# Patient Record
Sex: MALE | Race: WHITE | Employment: UNEMPLOYED | URBAN - METROPOLITAN AREA
[De-identification: names, ages, dates, MRNs, and addresses within clinical notes are randomized per-mention and may not be internally consistent; named-entity substitution may affect disease eponyms.]

---

## 2019-07-17 ENCOUNTER — HOSPITAL ENCOUNTER (EMERGENCY)
Age: 34
Discharge: HOME | End: 2019-07-18
Payer: MEDICAID

## 2019-07-17 VITALS
DIASTOLIC BLOOD PRESSURE: 75 MMHG | SYSTOLIC BLOOD PRESSURE: 126 MMHG | HEART RATE: 98 BPM | TEMPERATURE: 97.5 F | RESPIRATION RATE: 16 BRPM | BODY MASS INDEX: 21.97 KG/M2 | WEIGHT: 140 LBS | HEIGHT: 67 IN | OXYGEN SATURATION: 99 %

## 2019-07-17 DIAGNOSIS — L02.91 ABSCESS: Primary | ICD-10-CM

## 2019-07-17 PROCEDURE — 6370000000 HC RX 637 (ALT 250 FOR IP): Performed by: PHYSICIAN ASSISTANT

## 2019-07-17 PROCEDURE — 87205 SMEAR GRAM STAIN: CPT

## 2019-07-17 PROCEDURE — 87077 CULTURE AEROBIC IDENTIFY: CPT

## 2019-07-17 PROCEDURE — 87070 CULTURE OTHR SPECIMN AEROBIC: CPT

## 2019-07-17 PROCEDURE — 99282 EMERGENCY DEPT VISIT SF MDM: CPT

## 2019-07-17 PROCEDURE — 87186 SC STD MICRODIL/AGAR DIL: CPT

## 2019-07-17 RX ORDER — CLINDAMYCIN HYDROCHLORIDE 150 MG/1
450 CAPSULE ORAL ONCE
Status: COMPLETED | OUTPATIENT
Start: 2019-07-17 | End: 2019-07-17

## 2019-07-17 RX ORDER — CLINDAMYCIN HYDROCHLORIDE 150 MG/1
450 CAPSULE ORAL 3 TIMES DAILY
Qty: 90 CAPSULE | Refills: 0 | Status: SHIPPED | OUTPATIENT
Start: 2019-07-17 | End: 2019-07-27

## 2019-07-17 RX ADMIN — CLINDAMYCIN HYDROCHLORIDE 450 MG: 150 CAPSULE ORAL at 23:55

## 2019-07-17 ASSESSMENT — PAIN DESCRIPTION - LOCATION: LOCATION: SCROTUM

## 2019-07-17 ASSESSMENT — PAIN DESCRIPTION - ORIENTATION: ORIENTATION: OTHER (COMMENT)

## 2019-07-17 ASSESSMENT — ENCOUNTER SYMPTOMS
NAUSEA: 0
EYES NEGATIVE: 1
RESPIRATORY NEGATIVE: 1
SHORTNESS OF BREATH: 0
COUGH: 0
GASTROINTESTINAL NEGATIVE: 1
VOMITING: 0

## 2019-07-17 ASSESSMENT — PAIN DESCRIPTION - PAIN TYPE: TYPE: ACUTE PAIN

## 2019-07-17 ASSESSMENT — PAIN SCALES - GENERAL: PAINLEVEL_OUTOF10: 6

## 2019-07-18 NOTE — ED PROVIDER NOTES
[facility] Coma Scale  Eye Opening: Spontaneous  Best Verbal Response: Oriented  Best Motor Response: Obeys commands  Jorge Coma Scale Score: 15    [facility]     Heart Score         PHYSICAL EXAM  (up to 7 for level 4, 8 or more for level 5)     ED Triage Vitals [07/17/19 2035]   BP Temp Temp Source Pulse Resp SpO2 Height Weight   126/75 97.5 °F (36.4 °C) Oral 98 16 99 % 5' 7\" (1.702 m) 140 lb (63.5 kg)       Physical Exam   Constitutional: He appears well-developed and well-nourished. HENT:   Head: Normocephalic. Nose: Nose normal.   Mouth/Throat: Oropharynx is clear and moist. No oropharyngeal exudate. Eyes: Pupils are equal, round, and reactive to light. Conjunctivae and EOM are normal. Right eye exhibits no discharge. Left eye exhibits no discharge. Neck: Normal range of motion. Cardiovascular: Normal rate, regular rhythm and normal heart sounds. Exam reveals no gallop and no friction rub. No murmur heard. Pulmonary/Chest: Effort normal and breath sounds normal. No respiratory distress. He has no wheezes. Abdominal: Soft. Bowel sounds are normal. He exhibits no distension. There is no tenderness. Musculoskeletal: Normal range of motion. Neurological: He is alert. Skin: Skin is warm and dry. He is not diaphoretic. Psychiatric: He has a normal mood and affect. His behavior is normal.   Nursing note and vitals reviewed. DIAGNOSTIC RESULTS   LABS:    Labs Reviewed   WOUND CULTURE       All other labs werewithin normal range or not returned as of this dictation. RADIOLOGY:     Interpretation per the Radiologist below, if available at the time of this note:    No orders to display     No results found.         EMERGENCYDEPARTMENT COURSE and DIFFERENTIAL DIAGNOSIS/MDM:   Vitals:    Vitals:    07/17/19 2035   BP: 126/75   Pulse: 98   Resp: 16   Temp: 97.5 °F (36.4 °C)   TempSrc: Oral   SpO2: 99%   Weight: 140 lb (63.5 kg)   Height: 5' 7\" (1.702 m)       Patient was given symptoms occur. We have discussed the symptoms which are most concerning (e.g., changing or worsening pain, fever, numbness, weakness, cool or painful digits) that necessitate immediate return. FINAL IMPRESSION      1. Abscess          DISPOSITION/PLAN   DISPOSITION Decision To Discharge 07/17/2019 11:27:51 PM      PATIENT REFERRED TO:  Jacob Rehman MD  [address] [place] [address]  [phone]      for a recheck in 1-2  days    [facility] ED  [address] [facility]  [phone]    for a recheck in 1-2  days      DISCHARGE MEDICATIONS:  New Prescriptions    CLINDAMYCIN (CLEOCIN) 150 MG CAPSULE    Take 3 capsules by mouth 3 times daily for 10 days       DISCONTINUED MEDICATIONS:  Discontinued Medications    METHADONE (METHADOSE) 40 MG DISINTEGRATING TABLET    Take 120 mg by mouth every 4 hours as needed.                 (Please note that portions of this note were completed with a voice recognition program.  Efforts were made to edit the dictations but occasionally words are mis-transcribed.)    Merlinda Clarke, PA-C (electronically signed)          Merlinda Clarke, PA-C  07/17/19 8618

## 2019-07-20 LAB
GRAM STAIN RESULT: ABNORMAL
ORGANISM: ABNORMAL
WOUND/ABSCESS: ABNORMAL
WOUND/ABSCESS: ABNORMAL

## 2020-07-23 ENCOUNTER — HOSPITAL ENCOUNTER (EMERGENCY)
Age: 35
Discharge: LEFT BEFORE DISCHARGE | End: 2020-07-23
Attending: EMERGENCY MEDICINE
Payer: MEDICAID

## 2020-07-23 VITALS
BODY MASS INDEX: 25.9 KG/M2 | DIASTOLIC BLOOD PRESSURE: 68 MMHG | SYSTOLIC BLOOD PRESSURE: 158 MMHG | RESPIRATION RATE: 25 BRPM | HEART RATE: 89 BPM | WEIGHT: 165 LBS | HEIGHT: 67 IN | TEMPERATURE: 98.3 F | OXYGEN SATURATION: 91 %

## 2020-07-23 LAB
A/G RATIO: 1.5 (ref 1.1–2.2)
ACETAMINOPHEN LEVEL: <5 UG/ML (ref 10–30)
ALBUMIN SERPL-MCNC: 4.7 G/DL (ref 3.4–5)
ALP BLD-CCNC: 84 U/L (ref 40–129)
ALT SERPL-CCNC: 115 U/L (ref 10–40)
ANION GAP SERPL CALCULATED.3IONS-SCNC: 14 MMOL/L (ref 3–16)
AST SERPL-CCNC: 44 U/L (ref 15–37)
BASOPHILS ABSOLUTE: 0.1 K/UL (ref 0–0.2)
BASOPHILS RELATIVE PERCENT: 0.8 %
BILIRUB SERPL-MCNC: 1.1 MG/DL (ref 0–1)
BUN BLDV-MCNC: 12 MG/DL (ref 7–20)
CALCIUM SERPL-MCNC: 9.5 MG/DL (ref 8.3–10.6)
CHLORIDE BLD-SCNC: 96 MMOL/L (ref 99–110)
CO2: 25 MMOL/L (ref 21–32)
CREAT SERPL-MCNC: 0.9 MG/DL (ref 0.9–1.3)
EOSINOPHILS ABSOLUTE: 0 K/UL (ref 0–0.6)
EOSINOPHILS RELATIVE PERCENT: 0.2 %
ETHANOL: NORMAL MG/DL (ref 0–0.08)
GFR AFRICAN AMERICAN: >60
GFR NON-AFRICAN AMERICAN: >60
GLOBULIN: 3.1 G/DL
GLUCOSE BLD-MCNC: 108 MG/DL (ref 70–99)
HCT VFR BLD CALC: 43.7 % (ref 40.5–52.5)
HEMOGLOBIN: 15.1 G/DL (ref 13.5–17.5)
LYMPHOCYTES ABSOLUTE: 1.2 K/UL (ref 1–5.1)
LYMPHOCYTES RELATIVE PERCENT: 18.3 %
MCH RBC QN AUTO: 31.6 PG (ref 26–34)
MCHC RBC AUTO-ENTMCNC: 34.6 G/DL (ref 31–36)
MCV RBC AUTO: 91.2 FL (ref 80–100)
MONOCYTES ABSOLUTE: 0.5 K/UL (ref 0–1.3)
MONOCYTES RELATIVE PERCENT: 7.5 %
NEUTROPHILS ABSOLUTE: 5 K/UL (ref 1.7–7.7)
NEUTROPHILS RELATIVE PERCENT: 73.2 %
PDW BLD-RTO: 13.3 % (ref 12.4–15.4)
PLATELET # BLD: 171 K/UL (ref 135–450)
PMV BLD AUTO: 8 FL (ref 5–10.5)
POTASSIUM REFLEX MAGNESIUM: 3.7 MMOL/L (ref 3.5–5.1)
RBC # BLD: 4.79 M/UL (ref 4.2–5.9)
SALICYLATE, SERUM: <0.3 MG/DL (ref 15–30)
SODIUM BLD-SCNC: 135 MMOL/L (ref 136–145)
TOTAL PROTEIN: 7.8 G/DL (ref 6.4–8.2)
WBC # BLD: 6.8 K/UL (ref 4–11)

## 2020-07-23 PROCEDURE — 99285 EMERGENCY DEPT VISIT HI MDM: CPT

## 2020-07-23 PROCEDURE — G0480 DRUG TEST DEF 1-7 CLASSES: HCPCS

## 2020-07-23 PROCEDURE — 85025 COMPLETE CBC W/AUTO DIFF WBC: CPT

## 2020-07-23 PROCEDURE — 80053 COMPREHEN METABOLIC PANEL: CPT

## 2020-07-23 PROCEDURE — 93005 ELECTROCARDIOGRAM TRACING: CPT | Performed by: EMERGENCY MEDICINE

## 2020-07-23 NOTE — ED PROVIDER NOTES
Lifestyle    Physical activity     Days per week: Not on file     Minutes per session: Not on file    Stress: Not on file   Relationships    Social connections     Talks on phone: Not on file     Gets together: Not on file     Attends Orthodox service: Not on file     Active member of club or organization: Not on file     Attends meetings of clubs or organizations: Not on file     Relationship status: Not on file    Intimate partner violence     Fear of current or ex partner: Not on file     Emotionally abused: Not on file     Physically abused: Not on file     Forced sexual activity: Not on file   Other Topics Concern    Not on file   Social History Narrative    Not on file     No current facility-administered medications for this encounter. No current outpatient medications on file. No Known Allergies    REVIEW OF SYSTEMS  10 systems reviewed, pertinent positives and negatives per HPI, otherwise noted to be negative. PHYSICAL EXAM  ED Triage Vitals   Enc Vitals Group      BP 07/23/20 1753 (!) 150/98      Pulse 07/23/20 1749 92      Resp 07/23/20 1752 25      Temp 07/23/20 1749 98.3 °F (36.8 °C)      Temp Source 07/23/20 1749 Oral      SpO2 07/23/20 1752 91 %      Weight 07/23/20 1749 165 lb (74.8 kg)      Height 07/23/20 1749 5' 7\" (1.702 m)      Head Circumference --       Peak Flow --       Pain Score --       Pain Loc --       Pain Edu? --       Excl. in [address]? --          General appearance: Awake. Cooperative. No acute distress. HENT: Normocephalic. Atraumatic. Mucous membranes are moist.  Neck: Supple. Well healed tracheostomy site. Eyes: PERRL. EOMI. Heart/Chest: RRR. No murmurs. Lungs: Respirations unlabored. CTAB. Good air exchange. Speaking comfortably in full sentences. Abdomen: Soft. Non-tender. Non-distended. No rebound or guarding. Musculoskeletal: No extremity edema. No deformity. No tenderness in the extremities. All extremities neurovascularly intact.   Skin: Warm and dry. No acute rashes. Neurological:   - Alert and oriented to person, place, time, situation. - CN II-XII intact. - Full and symmetric strength bilateral upper and lower extremities. - Sensation intact to light touch in all extremities. - Normal rapidly alternating movements  - Normal finger to nose. Normal heel to shin.   - Gait is normal.   Psychiatric: Mood/affect: normal    LABS  I have reviewed all labs for this visit.    Results for orders placed or performed during the hospital encounter of 07/23/20   CBC Auto Differential   Result Value Ref Range    WBC 6.8 4.0 - 11.0 K/uL    RBC 4.79 4.20 - 5.90 M/uL    Hemoglobin 15.1 13.5 - 17.5 g/dL    Hematocrit 43.7 40.5 - 52.5 %    MCV 91.2 80.0 - 100.0 fL    MCH 31.6 26.0 - 34.0 pg    MCHC 34.6 31.0 - 36.0 g/dL    RDW 13.3 12.4 - 15.4 %    Platelets 133 223 - 567 K/uL    MPV 8.0 5.0 - 10.5 fL    Neutrophils % 73.2 %    Lymphocytes % 18.3 %    Monocytes % 7.5 %    Eosinophils % 0.2 %    Basophils % 0.8 %    Neutrophils Absolute 5.0 1.7 - 7.7 K/uL    Lymphocytes Absolute 1.2 1.0 - 5.1 K/uL    Monocytes Absolute 0.5 0.0 - 1.3 K/uL    Eosinophils Absolute 0.0 0.0 - 0.6 K/uL    Basophils Absolute 0.1 0.0 - 0.2 K/uL   Comprehensive Metabolic Panel w/ Reflex to MG   Result Value Ref Range    Sodium 135 (L) 136 - 145 mmol/L    Potassium reflex Magnesium 3.7 3.5 - 5.1 mmol/L    Chloride 96 (L) 99 - 110 mmol/L    CO2 25 21 - 32 mmol/L    Anion Gap 14 3 - 16    Glucose 108 (H) 70 - 99 mg/dL    BUN 12 7 - 20 mg/dL    CREATININE 0.9 0.9 - 1.3 mg/dL    GFR Non-African American >60 >60    GFR African American >60 >60    Calcium 9.5 8.3 - 10.6 mg/dL    Total Protein 7.8 6.4 - 8.2 g/dL    Alb 4.7 3.4 - 5.0 g/dL    Albumin/Globulin Ratio 1.5 1.1 - 2.2    Total Bilirubin 1.1 (H) 0.0 - 1.0 mg/dL    Alkaline Phosphatase 84 40 - 129 U/L     (H) 10 - 40 U/L    AST 44 (H) 15 - 37 U/L    Globulin 3.1 g/dL   Ethanol   Result Value Ref Range    Ethanol Lvl None Detected mg/dL   Acetaminophen level   Result Value Ref Range    Acetaminophen Level <5 (L) 10 - 30 ug/mL   Salicylate   Result Value Ref Range    Salicylate, Serum <0.7 (L) 15.0 - 30.0 mg/dL   EKG 12 Lead   Result Value Ref Range    Ventricular Rate 80 BPM    Atrial Rate 80 BPM    P-R Interval 128 ms    QRS Duration 80 ms    Q-T Interval 380 ms    QTc Calculation (Bazett) 438 ms    P Axis 35 degrees    R Axis -15 degrees    T Axis 44 degrees    Diagnosis       Normal sinus rhythmNormal ECGWhen compared with ECG of 17-SEP-2011 12:59,T wave amplitude has increased in Lateral leads       RADIOLOGY  I have reviewed all radiographic studies for this visit. No orders to display        ECG  EKG interpreted by myself. Rate: normal  Rhythm: NSR  Axis: normal  Intervals: within normal limits  ST Segments: no acute abnormality  T waves: no acute abnormality  Comparison: no prior  Impression: NSR with no acute abnormality       ED COURSE/MDM  Patient seen and evaluated. Old records reviewed. Labs and imaging reviewed and results discussed with patient/family to extent possible. This is a 59-year-old male who presents with concern for labile heart rate in the setting of ingesting jimson weed. On arrival the patient is fully alert and oriented, GCS 15. Slightly hypertensive with otherwise reassuring vital signs. Manifests no clinically apparent toxidrome. EKG reveals normal sinus rhythm with no acute abnormality. Mild hyponatremia and hypochloremia with preserved renal function. ALT is elevated at 115, AST just above the upper limit of normal at 44. CBC no leukocytosis or anemia. Acetaminophen, salicylates, and ethanol are negative. The patient eloped from the emergency department before his evaluation and treatment could be completed. Attempts were made to find the patient to no avail. Should the patient wish to continue his evaluation and treatment we would be glad to do so if he presents again.   Overall, however, the patient's exam, vital signs, diagnostics have been generally reassuring with the exception of a slight transaminitis. During the patient's ED course, the patient was given:  Medications - No data to display     All questions were answered and the patient/family expressed understanding and agreement with the plan. PROCEDURES  None    CRITICAL CARE  N/A    CLINICAL IMPRESSION  1. Palpitations    2. Substance abuse ([address]    3. Transaminitis        DISPOSITION   Eloped    Ezekiel Bennett MD    Note: This chart was created using voice recognition dictation software. Efforts were made by me to ensure accuracy, however some errors may be present due to limitations of this technology and occasionally words are not transcribed correctly.        Ezekiel Bennett MD  07/23/20 [id number]

## 2020-07-23 NOTE — ED NOTES
Writer in to see patient at this time. Patient is fully clothed and has pulled out IV. Patient states he wants to leave. Writer left room to locate Dr. Crescencio Hodge to notify him of patient wanting to leave AMA. When writer returned to room patient was already gone.       Marilee Rodriguez RN  07/23/20 7247

## 2020-07-24 LAB
EKG ATRIAL RATE: 80 BPM
EKG DIAGNOSIS: NORMAL
EKG P AXIS: 35 DEGREES
EKG P-R INTERVAL: 128 MS
EKG Q-T INTERVAL: 380 MS
EKG QRS DURATION: 80 MS
EKG QTC CALCULATION (BAZETT): 438 MS
EKG R AXIS: -15 DEGREES
EKG T AXIS: 44 DEGREES
EKG VENTRICULAR RATE: 80 BPM

## 2020-07-24 PROCEDURE — 93010 ELECTROCARDIOGRAM REPORT: CPT | Performed by: INTERNAL MEDICINE

## 2020-08-31 ENCOUNTER — HOSPITAL ENCOUNTER (EMERGENCY)
Age: 35
Discharge: HOME | End: 2020-08-31
Attending: EMERGENCY MEDICINE
Payer: MEDICAID

## 2020-08-31 VITALS
WEIGHT: 150 LBS | OXYGEN SATURATION: 99 % | SYSTOLIC BLOOD PRESSURE: 140 MMHG | HEIGHT: 68 IN | TEMPERATURE: 98.1 F | HEART RATE: 90 BPM | DIASTOLIC BLOOD PRESSURE: 83 MMHG | BODY MASS INDEX: 22.73 KG/M2 | RESPIRATION RATE: 20 BRPM

## 2020-08-31 LAB
A/G RATIO: 1.5 (ref 1.1–2.2)
ACETAMINOPHEN LEVEL: <5 UG/ML (ref 10–30)
ALBUMIN SERPL-MCNC: 4.5 G/DL (ref 3.4–5)
ALP BLD-CCNC: 76 U/L (ref 40–129)
ALT SERPL-CCNC: 109 U/L (ref 10–40)
AMPHETAMINE SCREEN, URINE: POSITIVE
ANION GAP SERPL CALCULATED.3IONS-SCNC: 10 MMOL/L (ref 3–16)
AST SERPL-CCNC: 89 U/L (ref 15–37)
BARBITURATE SCREEN URINE: ABNORMAL
BASOPHILS ABSOLUTE: 0 K/UL (ref 0–0.2)
BASOPHILS RELATIVE PERCENT: 0.7 %
BENZODIAZEPINE SCREEN, URINE: ABNORMAL
BILIRUB SERPL-MCNC: 1 MG/DL (ref 0–1)
BILIRUBIN URINE: ABNORMAL
BLOOD, URINE: NEGATIVE
BUN BLDV-MCNC: 21 MG/DL (ref 7–20)
CALCIUM SERPL-MCNC: 8.8 MG/DL (ref 8.3–10.6)
CANNABINOID SCREEN URINE: ABNORMAL
CHLORIDE BLD-SCNC: 97 MMOL/L (ref 99–110)
CLARITY: CLEAR
CO2: 24 MMOL/L (ref 21–32)
COCAINE METABOLITE SCREEN URINE: ABNORMAL
COLOR: YELLOW
CREAT SERPL-MCNC: 1.2 MG/DL (ref 0.9–1.3)
EOSINOPHILS ABSOLUTE: 0.1 K/UL (ref 0–0.6)
EOSINOPHILS RELATIVE PERCENT: 1.8 %
ETHANOL: NORMAL MG/DL (ref 0–0.08)
GFR AFRICAN AMERICAN: >60
GFR NON-AFRICAN AMERICAN: >60
GLOBULIN: 3.1 G/DL
GLUCOSE BLD-MCNC: 88 MG/DL (ref 70–99)
GLUCOSE BLD-MCNC: 92 MG/DL (ref 70–99)
GLUCOSE URINE: NEGATIVE MG/DL
HCT VFR BLD CALC: 42 % (ref 40.5–52.5)
HEMOGLOBIN: 14.4 G/DL (ref 13.5–17.5)
KETONES, URINE: 40 MG/DL
LEUKOCYTE ESTERASE, URINE: NEGATIVE
LIPASE: 23 U/L (ref 13–60)
LYMPHOCYTES ABSOLUTE: 1.5 K/UL (ref 1–5.1)
LYMPHOCYTES RELATIVE PERCENT: 28 %
Lab: ABNORMAL
MCH RBC QN AUTO: 31.2 PG (ref 26–34)
MCHC RBC AUTO-ENTMCNC: 34.3 G/DL (ref 31–36)
MCV RBC AUTO: 90.8 FL (ref 80–100)
METHADONE SCREEN, URINE: ABNORMAL
MICROSCOPIC EXAMINATION: ABNORMAL
MONOCYTES ABSOLUTE: 0.5 K/UL (ref 0–1.3)
MONOCYTES RELATIVE PERCENT: 9.3 %
NEUTROPHILS ABSOLUTE: 3.3 K/UL (ref 1.7–7.7)
NEUTROPHILS RELATIVE PERCENT: 60.2 %
NITRITE, URINE: NEGATIVE
OPIATE SCREEN URINE: ABNORMAL
OXYCODONE URINE: ABNORMAL
PDW BLD-RTO: 12.9 % (ref 12.4–15.4)
PERFORMED ON: NORMAL
PH UA: 6
PH UA: 6 (ref 5–8)
PHENCYCLIDINE SCREEN URINE: ABNORMAL
PLATELET # BLD: 161 K/UL (ref 135–450)
PMV BLD AUTO: 7.7 FL (ref 5–10.5)
POTASSIUM REFLEX MAGNESIUM: 3.7 MMOL/L (ref 3.5–5.1)
PROPOXYPHENE SCREEN: ABNORMAL
PROTEIN UA: NEGATIVE MG/DL
RBC # BLD: 4.62 M/UL (ref 4.2–5.9)
SALICYLATE, SERUM: <0.3 MG/DL (ref 15–30)
SODIUM BLD-SCNC: 131 MMOL/L (ref 136–145)
SPECIFIC GRAVITY UA: >=1.03 (ref 1–1.03)
TOTAL PROTEIN: 7.6 G/DL (ref 6.4–8.2)
URINE TYPE: ABNORMAL
UROBILINOGEN, URINE: 0.2 E.U./DL
WBC # BLD: 5.5 K/UL (ref 4–11)

## 2020-08-31 PROCEDURE — 99283 EMERGENCY DEPT VISIT LOW MDM: CPT

## 2020-08-31 PROCEDURE — 87086 URINE CULTURE/COLONY COUNT: CPT

## 2020-08-31 PROCEDURE — G0480 DRUG TEST DEF 1-7 CLASSES: HCPCS

## 2020-08-31 PROCEDURE — 81003 URINALYSIS AUTO W/O SCOPE: CPT

## 2020-08-31 PROCEDURE — 96360 HYDRATION IV INFUSION INIT: CPT

## 2020-08-31 PROCEDURE — 85025 COMPLETE CBC W/AUTO DIFF WBC: CPT

## 2020-08-31 PROCEDURE — 2580000003 HC RX 258: Performed by: EMERGENCY MEDICINE

## 2020-08-31 PROCEDURE — 80053 COMPREHEN METABOLIC PANEL: CPT

## 2020-08-31 PROCEDURE — 80307 DRUG TEST PRSMV CHEM ANLYZR: CPT

## 2020-08-31 PROCEDURE — 83690 ASSAY OF LIPASE: CPT

## 2020-08-31 RX ORDER — 0.9 % SODIUM CHLORIDE 0.9 %
1000 INTRAVENOUS SOLUTION INTRAVENOUS ONCE
Status: COMPLETED | OUTPATIENT
Start: 2020-08-31 | End: 2020-08-31

## 2020-08-31 RX ORDER — BUPROPION HYDROCHLORIDE 300 MG/1
TABLET ORAL
COMMUNITY
Start: 2020-08-18

## 2020-08-31 RX ADMIN — SODIUM CHLORIDE 1000 ML: 9 INJECTION, SOLUTION INTRAVENOUS at 05:58

## 2020-08-31 NOTE — ED PROVIDER NOTES
Emergency Physician Note  [address] [facility] ED  [address] [facility] [place]. [address]  Dept: [phone]  Loc: [phone]    Chief Complaint  Fatigue (pt arrives via EMS. Pt states he is weak, can't stop shaking, can't think right, woke up on floor around 1030pm. after 2 beers.)       History of Present Illness  Anna Marie Mosley III is a 27 y.o. male  has a past medical history of Anxiety, Guillain Barré syndrome ([address] Hepatitis C without hepatic coma, MRSA (methicillin resistant staph aureus) culture positive, Panic attack, and PTSD (post-traumatic stress disorder). who presents to the ED for generalized weakness. He states the symptoms been ongoing for the current day. Patient does not believe that he has been decreasing his fluid intake. He states he is also feeling shaky all over. Denies fever, chills, chest pain, shortness of breath, cough, abdominal pain, nausea, vomiting, diarrhea, headache, sore throat, dysuria, back pain, rash. No palliative/provocative factors. Unless otherwise stated in this report or unable to obtain because of the patient's clinical or mental status as evidenced by the medical record, this patient's positive and negative responses for review of systems, constitutional, psych, eyes, ENT, cardiovascular, respiratory, gastrointestinal, neurological, genitourinary, musculoskeletal, integument systems and systems related to the presenting problem are either stated in the preceding paragraph or were not pertinent or were negative for the symptoms and/or complaints related to the medical problem. I have reviewed the following from the nursing documentation:      Prior to Admission medications    Medication Sig Start Date End Date Taking?  Authorizing Provider   Buprenorphine HCl-Naloxone HCl (SUBOXONE SL) Place 8 mg under the tongue daily   Yes Historical Provider, MD   buPROPion (WELLBUTRIN XL) 300 MG extended release tablet 8/18/20   Historical Provider, MD       Allergies as of 08/31/2020    (No Known Allergies)       Past Medical History:   Diagnosis Date    Anxiety     [address]illain Barr[address] syndrome ([facility] [address]     Hepatitis C without hepatic coma     MRSA (methicillin resistant staph aureus) culture positive 07/17/2019    scrotal abscess    Panic attack     PTSD (post-traumatic stress disorder)         Surgical History:   Past Surgical History:   Procedure Laterality Date    HERNIA REPAIR      TRACHEOSTOMY      and removed        Family History:  History reviewed. No pertinent family history.     Social History     Socioeconomic History    Marital status: Single     Spouse name: Not on file    Number of children: Not on file    Years of education: Not on file    Highest education level: Not on file   Occupational History    Not on file   Social Needs    Financial resource strain: Not on file    Food insecurity     Worry: Not on file     Inability: Not on file    Transportation needs     Medical: Not on file     Non-medical: Not on file   Tobacco Use    Smoking status: Current Every Day Smoker     Packs/day: 1.00     Types: Cigarettes    Smokeless tobacco: Never Used   Substance and Sexual Activity    Alcohol use: Yes     Comment: rarely    Drug use: Yes     Types: Marijuana, Methamphetamines     Comment: Heroin    Sexual activity: Yes     Partners: Female   Lifestyle    Physical activity     Days per week: Not on file     Minutes per session: Not on file    Stress: Not on file   Relationships    Social connections     Talks on phone: Not on file     Gets together: Not on file     Attends Worship service: Not on file     Active member of club or organization: Not on file     Attends meetings of clubs or organizations: Not on file     Relationship status: Not on file    Intimate partner violence     Fear of current or ex partner: Not on file     Emotionally abused: Not on file     Physically abused: Not on file Forced sexual activity: Not on file   Other Topics Concern    Not on file   Social History Narrative    Not on file       Nursing notes reviewed. ED Triage Vitals [08/31/20 0421]   Enc Vitals Group      BP (!) 155/87      Pulse 75      Resp 16      Temp 98.1 °F (36.7 °C)      Temp Source Oral      SpO2 97 %      Weight 150 lb (68 kg)      Height 5' 8\" (1.727 m)      Head Circumference       Peak Flow       Pain Score       Pain Loc       Pain Edu? Excl. in [address]? GENERAL:    Awake, alert. Well developed, well nourished with no apparent distress. Nontoxic-appearing, non-ill-appearing. HENT:    Normocephalic, Atraumatic, no lacerations. No ENT exam due to PPE. Oropharynx clear, no tonsilar inflammation, no tonsilar exudates,   no airway obstruction, moist mucous membranes. Uvula was midline and has symmetric rise. Tongue extension was midline and without tremor  EYES:    Conjunctiva normal,   Pupils equal round and reactive to light,   Extraocular movements normal.  NECK:    Trachea is midline. No stridor. No lymphadenopathy of the anterior cervical chain   no lymphadenopathy of the posterior cervical chain. No meningeal signs, Supple without JVD. No C-spine midline tenderness. CHEST:    Regular rate and regular rhythm, no murmurs/rubs/gallops,   normal S1/S2, chest wall non-tender. LUNGS:    No respiratory distress. No abdominal retractions, no sternal retractions. Clear to auscultation bilaterally, no wheezing, no rhochi, no rales  Speaking comfortably in full sentences  ABDOMEN:    Soft, non-tender, non-distended. No guarding. No rebound. No costovertebral angle tenderness to palpation. Normal BS, no organomegaly, no abdominal masses  EXTREMITIES:   Moves extremities x4 with purpose. Normal range of motion, no edema,   no tenderness, no deformity,   distal pulses present and equal bilaterally. SKIN:    Warm, dry and intact.   Multiple tattoos  NEUROLOGIC:  Normal mental status. Moving all extremities to command. Alert and oriented x4   without focal motor deficit or gross sensory deficit. Tremors of all 4 extremities however they resolve when patient is distracted  Normal speech. Strength 5/5 in bilateral upper and lower extremities. Sensation is intact in the upper and lower extremities. Cranial Nerves 2-12 are intact. +2 Patellar Reflexes Bilaterally, +2 Achilles Reflexes Bilaterally. Light touch in lower extremities bilaterally is intact. No overlying rashes. LE strength is 5/5.     PSYCHIATRIC:  Not anxious,   normal mood and affect,   thoughts are linear and organized,   without delusions/hallucinations,   responds appropriately to questions      LABS and DIAGNOSTIC RESULTS    LABS  Results for orders placed or performed during the hospital encounter of 08/31/20   CBC Auto Differential   Result Value Ref Range    WBC 5.5 4.0 - 11.0 K/uL    RBC 4.62 4.20 - 5.90 M/uL    Hemoglobin 14.4 13.5 - 17.5 g/dL    Hematocrit 42.0 40.5 - 52.5 %    MCV 90.8 80.0 - 100.0 fL    MCH 31.2 26.0 - 34.0 pg    MCHC 34.3 31.0 - 36.0 g/dL    RDW 12.9 12.4 - 15.4 %    Platelets 852 044 - 102 K/uL    MPV 7.7 5.0 - 10.5 fL    Neutrophils % 60.2 %    Lymphocytes % 28.0 %    Monocytes % 9.3 %    Eosinophils % 1.8 %    Basophils % 0.7 %    Neutrophils Absolute 3.3 1.7 - 7.7 K/uL    Lymphocytes Absolute 1.5 1.0 - 5.1 K/uL    Monocytes Absolute 0.5 0.0 - 1.3 K/uL    Eosinophils Absolute 0.1 0.0 - 0.6 K/uL    Basophils Absolute 0.0 0.0 - 0.2 K/uL   Comprehensive Metabolic Panel w/ Reflex to MG   Result Value Ref Range    Sodium 131 (L) 136 - 145 mmol/L    Potassium reflex Magnesium 3.7 3.5 - 5.1 mmol/L    Chloride 97 (L) 99 - 110 mmol/L    CO2 24 21 - 32 mmol/L    Anion Gap 10 3 - 16    Glucose 88 70 - 99 mg/dL    BUN 21 (H) 7 - 20 mg/dL    CREATININE 1.2 0.9 - 1.3 mg/dL    GFR Non-African American >60 >60    GFR African American >60 >60    Calcium 8.8 8.3 - 10.6 mg/dL    Total Protein 7.6 6.4 - 8.2 g/dL    Alb 4.5 3.4 - 5.0 g/dL    Albumin/Globulin Ratio 1.5 1.1 - 2.2    Total Bilirubin 1.0 0.0 - 1.0 mg/dL    Alkaline Phosphatase 76 40 - 129 U/L     (H) 10 - 40 U/L    AST 89 (H) 15 - 37 U/L    Globulin 3.1 g/dL   Lipase   Result Value Ref Range    Lipase 23.0 13.0 - 60.0 U/L   Urinalysis, reflex to microscopic   Result Value Ref Range    Color, UA Yellow Straw/Yellow    Clarity, UA Clear Clear    Glucose, Ur Negative Negative mg/dL    Bilirubin Urine SMALL (A) Negative    Ketones, Urine 40 (A) Negative mg/dL    Specific Gravity, UA >=1.030 1.005 - 1.030    Blood, Urine Negative Negative    pH, UA 6.0 5.0 - 8.0    Protein, UA Negative Negative mg/dL    Urobilinogen, Urine 0.2 <2.0 E.U./dL    Nitrite, Urine Negative Negative    Leukocyte Esterase, Urine Negative Negative    Microscopic Examination Not Indicated     Urine Type NotGiven    Ethanol   Result Value Ref Range    Ethanol Lvl None Detected mg/dL   Drug screen multi urine   Result Value Ref Range    Amphetamine Screen, Urine POSITIVE (A) Negative <1000ng/mL    Barbiturate Screen, Ur Neg Negative <200 ng/mL    Benzodiazepine Screen, Urine Neg Negative <200 ng/mL    Cannabinoid Scrn, Ur Neg Negative <50 ng/mL    Cocaine Metabolite Screen, Urine Neg Negative <300 ng/mL    Opiate Scrn, Ur Neg Negative <300 ng/mL    PCP Screen, Urine Neg Negative <25 ng/mL    Methadone Screen, Urine Neg Negative <300 ng/mL    Propoxyphene Scrn, Ur Neg Negative <300 ng/mL    Oxycodone Urine Neg Negative <100 ng/ml    pH, UA 6.0     Drug Screen Comment: see below    Acetaminophen level   Result Value Ref Range    Acetaminophen Level <5 (L) 10 - 30 ug/mL   Salicylate   Result Value Ref Range    Salicylate, Serum <4.9 (L) 15.0 - 30.0 mg/dL   POCT Glucose   Result Value Ref Range    POC Glucose 92 70 - 99 mg/dl    Performed on ACCU-CHEK        PROCEDURES      MEDICAL DECISION MAKING    Procedures/interventions/images ordered for this visit  Orders Placed This Encounter   Procedures    Urine Culture    CBC Auto Differential    Comprehensive Metabolic Panel w/ Reflex to MG    Lipase    Urinalysis, reflex to microscopic    Ethanol    Drug screen multi urine    Acetaminophen level    Salicylate    POCT Glucose    Saline lock IV       Medications ordered for this visit  Orders Placed This Encounter   Medications    0.9 % sodium chloride bolus       ED course notes for this visit       I wore goggles, surgical mask, N95 mask and gloves when I evaluated the patient. I evaluated the patient in room 08/08     Patient does have mildly elevated AST and ALT which is consistent with his known hepatitis C. Based on the fact that the patient does not have hypoxia, does not have shortness of breath, does not have fever I feel that a chest x-ray is not warranted at this time    Patient admits to methamphetamine use yesterday but not today. This is a very pleasant patient who presents to the ER with the chief complaint of generalized weakness. There are no focal findings on exam, they are afebrile, well appearing and have stable v/s. At this time, there is no significant evidence for an obvious neurological process such as ischemic or hemorrhage stroke, encephalitis. There is no significant evidence for an infectious process such as pneumonia, UTI, meningitis. No obvious myopathy such as Guillain-Dallas, Lambert-Eaton, myasthenia gravis, or Tick-Borne disease. The exam and limited diagnostic studies performed in the emergency department today do not point towards acute coronary syndrome, pulmonary embolism, toxicity, shock, sepsis, unstable arrhythmia, hemodynamic or cardiopulmonary instability, severe anemia, or any disease process requiring other immediate medical or surgical intervention at this time.   It is understood that close follow up with their physician is indicated and if they are not improving as expected or if other new symptoms or signs of concern develop, other etiologies or diagnoses may need to be considered requiring other tests, treatments, consultations, and/or admission. If any symptom should worsen or change then they are to return to the ER immediately. The diagnosis, plan, expected course, follow-up, and return precautions were discussed and all questions were answered. Final Impression    1. Generalized weakness    2. Methamphetamine abuse ([facility] [address]    3. Chronic hepatitis C without hepatic coma ([facility] [address]    4. Mild dehydration        Blood pressure 134/86, pulse 84, temperature 98.1 °F (36.7 °C), temperature source Oral, resp. rate 10, height 5' 8\" (1.727 m), weight 150 lb (68 kg), SpO2 98 %. Disposition  At this point I do not feel the patient requires further work up and it is reasonable to discharge the patient. I had a discussion with the patient and/or their surrogate regarding diagnosis, diagnostic testing results, treatment/ plan of care, and follow up. Patient and/or companions verbalized understanding of the ED workup, any relevant findings as well as any incidental findings, and the disposition and plan. There was shared decision-making between myself as well as the patient and/or their surrogate and we are all in agreement with discharge home. There was an opportunity for questions and all questions were answered to the best of my ability and to the satisfaction of the patient and/or patient family. Patient agreed to follow upas recommend for further evaluation/treatment. The patient was given strict return precautions as we discussed symptoms that would necessitate return to the ED. Patient will return to ED for new/worsening symptoms. The patient verbalized their understanding and agreement with the above plan. Please refer to AVS for further details regarding discharge instructions. Patient was given scripts for the following medications.  I counseled patient how to take these medications. Current Discharge Medication List          Pt is in stable condition upon Discharge to home. The note was completed using Dragon voice recognition transcription. Every effort was made to ensure accuracy; however, inadvertent transcription errors may be present despite my best efforts to edit errors.     Jose Desai MD  [address] [place], [place] [address]Daggettmartha White MD  08/31/20 [id number]

## 2020-08-31 NOTE — ED NOTES
Patient ambulated down hallway without assistance.  Spo2 95% -108     Rachell Camacho RN  08/31/20 [id number]

## 2020-09-01 ENCOUNTER — CARE COORDINATION (OUTPATIENT)
Dept: CASE MANAGEMENT | Age: 35
End: 2020-09-01

## 2020-09-01 LAB — URINE CULTURE, ROUTINE: NORMAL

## 2020-09-03 ENCOUNTER — CARE COORDINATION (OUTPATIENT)
Dept: CASE MANAGEMENT | Age: 35
End: 2020-09-03

## 2020-09-03 NOTE — CARE COORDINATION
Second attempt to contact patient for ED F/U; left  HIPPA compliant message and ACM contact information  No further outreach scheduled with this ACM; episode of care resolved.